# Patient Record
Sex: FEMALE | Race: WHITE | NOT HISPANIC OR LATINO | Employment: OTHER | URBAN - METROPOLITAN AREA
[De-identification: names, ages, dates, MRNs, and addresses within clinical notes are randomized per-mention and may not be internally consistent; named-entity substitution may affect disease eponyms.]

---

## 2018-01-01 ENCOUNTER — OFFICE VISIT (OUTPATIENT)
Dept: MEDICAL GROUP | Facility: MEDICAL CENTER | Age: 68
End: 2018-01-01
Payer: MEDICARE

## 2018-01-01 ENCOUNTER — HOSPITAL ENCOUNTER (OUTPATIENT)
Dept: RADIOLOGY | Facility: MEDICAL CENTER | Age: 68
End: 2018-10-30

## 2018-01-01 ENCOUNTER — APPOINTMENT (OUTPATIENT)
Dept: MEDICAL GROUP | Age: 68
End: 2018-01-01
Payer: MEDICARE

## 2018-01-01 VITALS
DIASTOLIC BLOOD PRESSURE: 62 MMHG | HEART RATE: 96 BPM | RESPIRATION RATE: 16 BRPM | WEIGHT: 141.2 LBS | TEMPERATURE: 98.1 F | HEIGHT: 65 IN | SYSTOLIC BLOOD PRESSURE: 102 MMHG | BODY MASS INDEX: 23.53 KG/M2 | OXYGEN SATURATION: 91 %

## 2018-01-01 DIAGNOSIS — Z23 NEED FOR VACCINATION: ICD-10-CM

## 2018-01-01 DIAGNOSIS — E89.0 POSTOPERATIVE HYPOTHYROIDISM: ICD-10-CM

## 2018-01-01 DIAGNOSIS — J43.1 PANLOBULAR EMPHYSEMA (HCC): ICD-10-CM

## 2018-01-01 DIAGNOSIS — Z76.89 ESTABLISHING CARE WITH NEW DOCTOR, ENCOUNTER FOR: ICD-10-CM

## 2018-01-01 DIAGNOSIS — C34.90 SMALL CELL LUNG CANCER (HCC): ICD-10-CM

## 2018-01-01 DIAGNOSIS — H54.40 BLIND LEFT EYE: ICD-10-CM

## 2018-01-01 PROCEDURE — 90662 IIV NO PRSV INCREASED AG IM: CPT | Performed by: INTERNAL MEDICINE

## 2018-01-01 PROCEDURE — 99204 OFFICE O/P NEW MOD 45 MIN: CPT | Mod: 25 | Performed by: INTERNAL MEDICINE

## 2018-01-01 PROCEDURE — G0008 ADMIN INFLUENZA VIRUS VAC: HCPCS | Performed by: INTERNAL MEDICINE

## 2018-01-01 RX ORDER — LEVOTHYROXINE SODIUM 88 UG/1
88 TABLET ORAL
Qty: 90 TAB | Refills: 1 | Status: SHIPPED | OUTPATIENT
Start: 2018-01-01

## 2018-01-01 RX ORDER — LEVOTHYROXINE SODIUM 88 UG/1
88 TABLET ORAL
COMMUNITY
End: 2018-01-01 | Stop reason: SDUPTHER

## 2018-01-01 RX ORDER — ALBUTEROL SULFATE 90 UG/1
2 AEROSOL, METERED RESPIRATORY (INHALATION) EVERY 6 HOURS PRN
COMMUNITY
End: 2019-01-01

## 2018-01-01 ASSESSMENT — PATIENT HEALTH QUESTIONNAIRE - PHQ9: CLINICAL INTERPRETATION OF PHQ2 SCORE: 0

## 2018-10-02 PROBLEM — E89.0 POSTOPERATIVE HYPOTHYROIDISM: Status: ACTIVE | Noted: 2018-01-01

## 2018-10-02 PROBLEM — H54.40 BLIND LEFT EYE: Status: ACTIVE | Noted: 2018-01-01

## 2018-10-02 PROBLEM — J43.1 PANLOBULAR EMPHYSEMA (HCC): Status: ACTIVE | Noted: 2018-01-01

## 2018-10-02 PROBLEM — C34.90 SMALL CELL LUNG CANCER (HCC): Status: ACTIVE | Noted: 2018-01-01

## 2018-10-02 NOTE — PROGRESS NOTES
Subjective:   Asha Valero is a 68 y.o. female here today to establish care and              1. Establishing care with new doctor, encounter for    Patient recently moved from [place] with her son, who is a  at [employer].  She lives off [facility] in [place].  She was seen by her oncologist last month and had total body CT scan for surveillance, see below.    2. Small cell lung cancer (HCC)    Limited disease, diagnosed in 2016, patient is status post chemotherapy and is in remission.  She has had surveillance CT scans done every 3 months which is been negative for recurrent disease.    3. Panlobular emphysema (HCC)    Diagnosed several years ago, had PFTs about 3 years ago.  She was tried on Advair, however this resulted in a hoarse voice therefore she discontinued it.  Patient denies chronic cough, she is short of breath with exertion, however she is able to hike 20-30 minutes a day at about 5000 feet elevation.  She has an albuterol inhaler which she takes as needed, never more than once or twice a day.    4. Postoperative hypothyroidism    On replacement levothyroxine for about 30 years.  Last TSH was normal no adjustment in dose needed.    5. 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  Having some blurry vision in that right eye.    6. Need for vaccination    Up-to-date with pneumonia shot, needs flu shot      Current medicines (including changes today)  Current Outpatient Prescriptions   Medication Sig Dispense Refill   • levothyroxine (SYNTHROID) 88 MCG Tab Take 88 mcg by mouth Every morning on an empty stomach.     • albuterol 108 (90 Base) MCG/ACT Aero Soln inhalation aerosol Inhale 2 Puffs by mouth every 6 hours as needed for Shortness of Breath.       No current facility-administered medications for this visit.      She  has a past medical history of Cancer (HCC).    Social History     Social History   • Marital status: Single      "Spouse name: N/A   • Number of children: N/A   • Years of education: N/A     Social History Main Topics   • Smoking status: Former Smoker     Packs/day: 1.00     Years: 45.00     Quit date: 10/2/2011   • Smokeless tobacco: Never Used   • Alcohol use No   • Drug use: Unknown   • Sexual activity: Not on file     Other Topics Concern   • Exercise Yes     Social History Narrative   • No narrative on file     Family History   Problem Relation Age of Onset   • Other Mother         brain aneurysm       ROS       - Constitutional: Negative for fever, chills, unexpected weight change, and fatigue/generalized weakness.     - HEENT: Negative for headaches, positive blurry vision left eye    - Respiratory: Negative for cough, Shortness of breath    - Cardiovascular: Negative for chest pain and bilateral lower extremity edema.     - Gastrointestinal: Negative for heartburn,  abdominal pain,  diarrhea, constipation     - Genitourinary: Negative for dysuria  and urinary incontinence.    - Musculoskeletal: Negative for  back pain and joint pain.     - Skin: Negative for rash     - Neurological: Negative for dizziness,  tremors, focal weakness     - Psychiatric/Behavioral: Negative for depression and memory loss.             Objective:     Blood pressure 102/62, pulse 96, temperature 36.7 °C (98.1 °F), temperature source Temporal, resp. rate 16, height 1.651 m (5' 5\"), weight 64 kg (141 lb 3.2 oz), SpO2 91 %, not currently breastfeeding. Body mass index is 23.5 kg/m².     Physical Exam:  Constitutional: Alert, no distress.  Skin: Warm, dry, good turgor, no rashes in visible areas.  Eye: Equal, round and reactive, conjunctiva clear, lids normal.  ENMT: Lips without lesions, good dentition, oropharynx clear. Hearing grossly intact.  Neck: No masses, no thyromegaly. No cervical or supraclavicular lymphadenopathy  Respiratory: Unlabored respiratory effort, bilateral expiratory wheeze throughout, fair air movement  Cardiovascular: " Regular rate and rhythm, without murmur, no edema.  Abdomen: Soft, non-tender, no masses, no hepatosplenomegaly.  Psych: Alert and oriented x3, normal affect and mood. Insight and judgment good            Assessment and Plan:   The following treatment plan was discussed    1. Establishing care with new doctor, encounter for    Patient recently had a whole-body CT scan, do not believe mammogram is indicated at this time.  Patient is concerned about radiation exposure.    2. Small cell lung cancer (HCC)    We discussed that I believe every 6-month CT surveillance is reasonable.  If patient does recur with cancer, it is not going to be curable no matter when it is detected.    3. Panlobular emphysema (HCC)    Patient with borderline hypoxia, and wheezes on lung exam.  I recommended inhaler such as Spiriva and/or Advair.  Patient is concerned about the cost, and the side effects.  She declines at this time.    4. Postoperative hypothyroidism    Stable, continue Synthroid    5. Blind left eye      - REFERRAL TO OPHTHALMOLOGY    6. Need for vaccination      - INFLUENZA VACCINE, HIGH DOSE (65+ ONLY)      Followup: Patient was offered follow-up appointment, she would like to establish with provider at [facility] which is closer to her house.  Attempt was made to schedule this for patient, however she left prior to it being done.  I do recommend 6-month follow-up for labs and CT scan.

## 2019-01-01 ENCOUNTER — OFFICE VISIT (OUTPATIENT)
Dept: HEMATOLOGY ONCOLOGY | Facility: MEDICAL CENTER | Age: 69
End: 2019-01-01
Payer: MEDICARE

## 2019-01-01 ENCOUNTER — HOME CARE VISIT (OUTPATIENT)
Dept: HOSPICE | Facility: HOSPICE | Age: 69
End: 2019-01-01
Payer: MEDICARE

## 2019-01-01 ENCOUNTER — HOSPITAL ENCOUNTER (OUTPATIENT)
Dept: LAB | Facility: MEDICAL CENTER | Age: 69
End: 2019-01-09
Attending: NURSE PRACTITIONER
Payer: MEDICARE

## 2019-01-01 ENCOUNTER — APPOINTMENT (OUTPATIENT)
Dept: MEDICAL GROUP | Age: 69
End: 2019-01-01
Payer: MEDICARE

## 2019-01-01 ENCOUNTER — TELEPHONE (OUTPATIENT)
Dept: HEMATOLOGY ONCOLOGY | Facility: MEDICAL CENTER | Age: 69
End: 2019-01-01

## 2019-01-01 ENCOUNTER — HOSPITAL ENCOUNTER (OUTPATIENT)
Facility: MEDICAL CENTER | Age: 69
End: 2019-01-15
Attending: INTERNAL MEDICINE | Admitting: INTERNAL MEDICINE
Payer: MEDICARE

## 2019-01-01 ENCOUNTER — HOSPITAL ENCOUNTER (OUTPATIENT)
Dept: LAB | Facility: MEDICAL CENTER | Age: 69
End: 2019-01-14
Attending: NURSE PRACTITIONER
Payer: MEDICARE

## 2019-01-01 ENCOUNTER — TELEPHONE (OUTPATIENT)
Dept: MEDICAL GROUP | Age: 69
End: 2019-01-01

## 2019-01-01 ENCOUNTER — APPOINTMENT (OUTPATIENT)
Dept: RADIOLOGY | Facility: MEDICAL CENTER | Age: 69
End: 2019-01-01
Attending: NURSE PRACTITIONER
Payer: MEDICARE

## 2019-01-01 ENCOUNTER — APPOINTMENT (OUTPATIENT)
Dept: HOSPICE | Facility: HOSPICE | Age: 69
End: 2019-01-01
Payer: MEDICARE

## 2019-01-01 ENCOUNTER — APPOINTMENT (OUTPATIENT)
Dept: HEMATOLOGY ONCOLOGY | Facility: MEDICAL CENTER | Age: 69
End: 2019-01-01
Payer: MEDICARE

## 2019-01-01 ENCOUNTER — HOSPITAL ENCOUNTER (OUTPATIENT)
Dept: RADIOLOGY | Facility: MEDICAL CENTER | Age: 69
End: 2019-01-10
Attending: NURSE PRACTITIONER
Payer: MEDICARE

## 2019-01-01 ENCOUNTER — HOSPICE ADMISSION (OUTPATIENT)
Dept: HOSPICE | Facility: HOSPICE | Age: 69
End: 2019-01-01
Payer: MEDICARE

## 2019-01-01 ENCOUNTER — PATIENT OUTREACH (OUTPATIENT)
Dept: OTHER | Facility: MEDICAL CENTER | Age: 69
End: 2019-01-01

## 2019-01-01 ENCOUNTER — OFFICE VISIT (OUTPATIENT)
Dept: MEDICAL GROUP | Age: 69
End: 2019-01-01
Payer: MEDICARE

## 2019-01-01 VITALS
DIASTOLIC BLOOD PRESSURE: 54 MMHG | SYSTOLIC BLOOD PRESSURE: 92 MMHG | WEIGHT: 139 LBS | RESPIRATION RATE: 13 BRPM | HEIGHT: 65 IN | HEART RATE: 86 BPM | BODY MASS INDEX: 23.16 KG/M2 | OXYGEN SATURATION: 95 %

## 2019-01-01 VITALS
RESPIRATION RATE: 16 BRPM | HEIGHT: 65 IN | TEMPERATURE: 97.7 F | OXYGEN SATURATION: 90 % | HEART RATE: 88 BPM | DIASTOLIC BLOOD PRESSURE: 86 MMHG | RESPIRATION RATE: 20 BRPM | SYSTOLIC BLOOD PRESSURE: 112 MMHG | BODY MASS INDEX: 22.99 KG/M2 | HEART RATE: 98 BPM | WEIGHT: 138.01 LBS | DIASTOLIC BLOOD PRESSURE: 50 MMHG | SYSTOLIC BLOOD PRESSURE: 80 MMHG

## 2019-01-01 VITALS
HEART RATE: 96 BPM | BODY MASS INDEX: 23.19 KG/M2 | HEIGHT: 65 IN | SYSTOLIC BLOOD PRESSURE: 112 MMHG | DIASTOLIC BLOOD PRESSURE: 68 MMHG | WEIGHT: 139.2 LBS | OXYGEN SATURATION: 94 % | TEMPERATURE: 96.7 F

## 2019-01-01 VITALS — HEART RATE: 72 BPM | RESPIRATION RATE: 18 BRPM | SYSTOLIC BLOOD PRESSURE: 100 MMHG | DIASTOLIC BLOOD PRESSURE: 60 MMHG

## 2019-01-01 VITALS
RESPIRATION RATE: 14 BRPM | TEMPERATURE: 98.8 F | SYSTOLIC BLOOD PRESSURE: 110 MMHG | WEIGHT: 135.8 LBS | HEART RATE: 102 BPM | BODY MASS INDEX: 23.18 KG/M2 | HEIGHT: 64 IN | OXYGEN SATURATION: 90 % | DIASTOLIC BLOOD PRESSURE: 75 MMHG

## 2019-01-01 VITALS — SYSTOLIC BLOOD PRESSURE: 110 MMHG | DIASTOLIC BLOOD PRESSURE: 60 MMHG | RESPIRATION RATE: 20 BRPM | HEART RATE: 72 BPM

## 2019-01-01 VITALS
SYSTOLIC BLOOD PRESSURE: 80 MMHG | HEART RATE: 88 BPM | HEART RATE: 68 BPM | SYSTOLIC BLOOD PRESSURE: 90 MMHG | RESPIRATION RATE: 20 BRPM | DIASTOLIC BLOOD PRESSURE: 50 MMHG | RESPIRATION RATE: 18 BRPM | DIASTOLIC BLOOD PRESSURE: 50 MMHG

## 2019-01-01 VITALS — RESPIRATION RATE: 18 BRPM | HEART RATE: 92 BPM | SYSTOLIC BLOOD PRESSURE: 120 MMHG | DIASTOLIC BLOOD PRESSURE: 78 MMHG

## 2019-01-01 VITALS — RESPIRATION RATE: 24 BRPM | HEART RATE: 100 BPM

## 2019-01-01 VITALS — SYSTOLIC BLOOD PRESSURE: 90 MMHG | DIASTOLIC BLOOD PRESSURE: 50 MMHG

## 2019-01-01 DIAGNOSIS — C34.90 METASTATIC NON-SMALL CELL LUNG CANCER (HCC): ICD-10-CM

## 2019-01-01 DIAGNOSIS — E89.0 POSTOPERATIVE HYPOTHYROIDISM: ICD-10-CM

## 2019-01-01 DIAGNOSIS — R19.06 EPIGASTRIC MASS: ICD-10-CM

## 2019-01-01 DIAGNOSIS — C34.90 SMALL CELL LUNG CANCER (HCC): ICD-10-CM

## 2019-01-01 DIAGNOSIS — Z85.118 HISTORY OF LUNG CANCER: ICD-10-CM

## 2019-01-01 DIAGNOSIS — J43.1 PANLOBULAR EMPHYSEMA (HCC): ICD-10-CM

## 2019-01-01 DIAGNOSIS — K76.9 LIVER LESION: ICD-10-CM

## 2019-01-01 DIAGNOSIS — R19.06 ABDOMINAL WALL MASS OF EPIGASTRIC REGION: ICD-10-CM

## 2019-01-01 LAB
BUN SERPL-MCNC: 18 MG/DL (ref 8–22)
CREAT SERPL-MCNC: 0.91 MG/DL (ref 0.5–1.4)
INR PPP: 1.22 (ref 0.87–1.13)
PATHOLOGY CONSULT NOTE: NORMAL
PLATELET # BLD AUTO: 66 K/UL (ref 164–446)
PROTHROMBIN TIME: 15.5 SEC (ref 12–14.6)

## 2019-01-01 PROCEDURE — S9126 HOSPICE CARE, IN THE HOME, P: HCPCS

## 2019-01-01 PROCEDURE — G0299 HHS/HOSPICE OF RN EA 15 MIN: HCPCS

## 2019-01-01 PROCEDURE — 6650990 HC HOSPICE AND HOME CARE RX REV CODE 0250

## 2019-01-01 PROCEDURE — 36415 COLL VENOUS BLD VENIPUNCTURE: CPT

## 2019-01-01 PROCEDURE — 700117 HCHG RX CONTRAST REV CODE 255: Performed by: NURSE PRACTITIONER

## 2019-01-01 PROCEDURE — 160002 HCHG RECOVERY MINUTES (STAT)

## 2019-01-01 PROCEDURE — 85610 PROTHROMBIN TIME: CPT

## 2019-01-01 PROCEDURE — G0156 HHCP-SVS OF AIDE,EA 15 MIN: HCPCS

## 2019-01-01 PROCEDURE — 99214 OFFICE O/P EST MOD 30 MIN: CPT | Performed by: INTERNAL MEDICINE

## 2019-01-01 PROCEDURE — 88307 TISSUE EXAM BY PATHOLOGIST: CPT

## 2019-01-01 PROCEDURE — 700111 HCHG RX REV CODE 636 W/ 250 OVERRIDE (IP)

## 2019-01-01 PROCEDURE — G0155 HHCP-SVS OF CSW,EA 15 MIN: HCPCS

## 2019-01-01 PROCEDURE — 74160 CT ABDOMEN W/CONTRAST: CPT

## 2019-01-01 PROCEDURE — 82565 ASSAY OF CREATININE: CPT

## 2019-01-01 PROCEDURE — 84520 ASSAY OF UREA NITROGEN: CPT

## 2019-01-01 PROCEDURE — 665036 HSPC NOTICE OF ELECTION NOE

## 2019-01-01 PROCEDURE — 76942 ECHO GUIDE FOR BIOPSY: CPT

## 2019-01-01 PROCEDURE — 99205 OFFICE O/P NEW HI 60 MIN: CPT | Performed by: NURSE PRACTITIONER

## 2019-01-01 PROCEDURE — 85049 AUTOMATED PLATELET COUNT: CPT

## 2019-01-01 PROCEDURE — 99215 OFFICE O/P EST HI 40 MIN: CPT | Mod: GC | Performed by: INTERNAL MEDICINE

## 2019-01-01 PROCEDURE — 700105 HCHG RX REV CODE 258: Performed by: RADIOLOGY

## 2019-01-01 PROCEDURE — 8041 PR SCP AHA: Performed by: INTERNAL MEDICINE

## 2019-01-01 PROCEDURE — 700111 HCHG RX REV CODE 636 W/ 250 OVERRIDE (IP): Performed by: RADIOLOGY

## 2019-01-01 RX ORDER — MIDAZOLAM HYDROCHLORIDE 1 MG/ML
.5-2 INJECTION INTRAMUSCULAR; INTRAVENOUS PRN
Status: DISCONTINUED | OUTPATIENT
Start: 2019-01-01 | End: 2019-01-01 | Stop reason: HOSPADM

## 2019-01-01 RX ORDER — MIDAZOLAM HYDROCHLORIDE 1 MG/ML
INJECTION INTRAMUSCULAR; INTRAVENOUS
Status: COMPLETED
Start: 2019-01-01 | End: 2019-01-01

## 2019-01-01 RX ORDER — OXYCODONE HYDROCHLORIDE 5 MG/1
2.5 TABLET ORAL
Status: CANCELLED | OUTPATIENT
Start: 2019-01-01 | End: 2019-01-01

## 2019-01-01 RX ORDER — SODIUM CHLORIDE 9 MG/ML
500 INJECTION, SOLUTION INTRAVENOUS
Status: DISCONTINUED | OUTPATIENT
Start: 2019-01-01 | End: 2019-01-01 | Stop reason: HOSPADM

## 2019-01-01 RX ORDER — OXYCODONE HYDROCHLORIDE 5 MG/1
5 TABLET ORAL
Status: CANCELLED | OUTPATIENT
Start: 2019-01-01 | End: 2019-01-01

## 2019-01-01 RX ADMIN — IOHEXOL 100 ML: 350 INJECTION, SOLUTION INTRAVENOUS at 14:30

## 2019-01-01 RX ADMIN — FENTANYL CITRATE 50 MCG: 50 INJECTION, SOLUTION INTRAMUSCULAR; INTRAVENOUS at 10:17

## 2019-01-01 RX ADMIN — MIDAZOLAM 2 MG: 1 INJECTION INTRAMUSCULAR; INTRAVENOUS at 10:17

## 2019-01-01 RX ADMIN — IOHEXOL 50 ML: 240 INJECTION, SOLUTION INTRATHECAL; INTRAVASCULAR; INTRAVENOUS; ORAL at 14:30

## 2019-01-01 RX ADMIN — SODIUM CHLORIDE 500 ML: 9 INJECTION, SOLUTION INTRAVENOUS at 10:37

## 2019-01-01 RX ADMIN — MIDAZOLAM 1 MG: 1 INJECTION INTRAMUSCULAR; INTRAVENOUS at 10:22

## 2019-01-01 RX ADMIN — MIDAZOLAM 1 MG: 1 INJECTION INTRAMUSCULAR; INTRAVENOUS at 10:20

## 2019-01-01 SDOH — ECONOMIC STABILITY: HOUSING INSECURITY: EVIDENCE OF SMOKING MATERIAL: 0

## 2019-01-01 SDOH — ECONOMIC STABILITY: HOUSING INSECURITY: HOME SAFETY: YOUR FACE OR UPPER PART OF YOUR BODY.  AVOID TRIPPING OVER OXYGEN TUBING.

## 2019-01-01 SDOH — ECONOMIC STABILITY: GENERAL: NECESSITIES UNABLE TO AFFORD: FOOD

## 2019-01-01 SDOH — HEALTH STABILITY: MENTAL HEALTH
STRESS FACTORS COMMENTS: THE PATIENT VERBALIZED THAT SHE IS FRUSTRATED WITH BEING CONFUSED AND NOT BEING ABLE TO DO THE THINGS THAT SHE USED TO BE ABLE TO DO. USE THE REMOTE FOR THE TV AND USE HER CELL PHONE FOR EXAMPLE.

## 2019-01-01 SDOH — ECONOMIC STABILITY: GENERAL: NECESSITIES UNABLE TO AFFORD: MEDICAL EXPENSES

## 2019-01-01 SDOH — ECONOMIC STABILITY: HOUSING INSECURITY
HOME SAFETY: YOUR OXYGEN. THIS INCLUDES CLEANING PRODUCTS THAT CONTAIN OIL, GREASE, ALCOHOL, OR OTHER LIQUIDS THAT CAN BURN.  KEEP OXYGEN AWAY FROM ANY OPEN FLAMES OR BASEBOARD HEATING SYSTEMS.  DO NOT USE VASELINE OR OTHER PETROLEUM-BASED CREAMS AND LOTIONS ON

## 2019-01-01 SDOH — HEALTH STABILITY: MENTAL HEALTH: STRESS FACTORS COMMENTS: PATIENT IS LEFT ALONE AT TIMES, BUT STATES THE SHE IS NOT FEELING ANXIOUS ABOUT THIS.

## 2019-01-01 SDOH — ECONOMIC STABILITY: HOUSING INSECURITY
HOME SAFETY: THE FOLLOWING OXYGEN SAFETY EDUCATION WAS PROVIDED:  NO OPEN FLAMES.  POSTED  'OXYGEN IN USE' SIGN ON EXTERNAL DOOR  OR WINDOW OF HOME.  NEED FOR A  FUNCTIONAL FIRE EXTINGUISHER AND SMOKE DETECTORS IN HOME.  KEEP LIQUIDS THAT MAY CATCH FIRE AWAY FROM

## 2019-01-01 SDOH — ECONOMIC STABILITY: GENERAL: NECESSITIES UNABLE TO AFFORD: SUPPLIES

## 2019-01-01 ASSESSMENT — ENCOUNTER SYMPTOMS
NAUSEA: 1
DRY SKIN: 1
FATIGUES EASILY: 1
COUGH: 1
DYSPNEA ON EXERTION: 1
HEADACHES: 0
COUGH: 1
COUGH: 1
HEMOPTYSIS: 1
DRY SKIN: 1
FORGETFULNESS: 1
DYSPNEA ACTIVITY LEVEL: AFTER AMBULATING LESS THAN 10 FT
SPUTUM PRODUCTION: 1
DYSPNEA ACTIVITY LEVEL: AFTER AMBULATING MORE THAN 20 FT
DRY SKIN: 1
SLEEP QUALITY: FAIR
LAST BOWEL MOVEMENT: 65066
MUSCULOSKELETAL NEGATIVE: 1
DRY SKIN: 1
LAST BOWEL MOVEMENT: 65055
FATIGUES EASILY: 1
LAST BOWEL MOVEMENT: 65076
LAST BOWEL MOVEMENT: 65068
SHORTNESS OF BREATH: 1
SLEEP QUALITY: ADEQUATE
SLEEP QUALITY: ADEQUATE
CONSTIPATION: 1
DYSPNEA ON EXERTION: 1
DRY SKIN: 1
DYSPNEA ON EXERTION: 1
DYSPNEA ON EXERTION: 1
COUGH: 1
HEMOPTYSIS: 1
GASTROINTESTINAL NEGATIVE: 1
BLOOD IN STOOL: 0
CONSTIPATION: 1
DYSPNEA ACTIVITY LEVEL: AT REST
DESCRIPTION OF MEMORY LOSS: IMMEDIATE
CONSTIPATION: 1
COUGH: 1
LAST BOWEL MOVEMENT: 65073
DYSPNEA ON EXERTION: 1
SLEEP QUALITY: ADEQUATE
DESCRIPTION OF MEMORY LOSS: IMMEDIATE
SHORTNESS OF BREATH: 1
STOOL FREQUENCY: LESS THAN DAILY
COUGH CHARACTERISTICS: PRODUCTIVE
CONSTIPATION: 1
DRY SKIN: 1
BOWEL INCONTINENCE: 1
BOWEL INCONTINENCE: 1
COUGH CHARACTERISTICS: PRODUCTIVE
COUGH CHARACTERISTICS: PRODUCTIVE
INSOMNIA: 1
COUGH: 1
SHORTNESS OF BREATH: 1
SORE THROAT: 0
LAST BOWEL MOVEMENT: 65063
STOOL FREQUENCY: LESS THAN DAILY
VOMITING: 0
SHORTNESS OF BREATH: 1
FATIGUES EASILY: 1
STOOL FREQUENCY: LESS THAN DAILY
SLEEP QUALITY: ADEQUATE
DYSPNEA ACTIVITY LEVEL: AT REST
STOOL FREQUENCY: LESS THAN DAILY
COUGH CHARACTERISTICS: PRODUCTIVE
DRY SKIN: 1
FATIGUES EASILY: 1
CARDIOVASCULAR NEGATIVE: 1
COUGH CHARACTERISTICS: PRODUCTIVE
FATIGUES EASILY: 1
COUGH: 1
DYSPNEA ACTIVITY LEVEL: AFTER AMBULATING LESS THAN 10 FT
SHORTNESS OF BREATH: 1
SLEEP QUALITY: ADEQUATE
ABDOMINAL PAIN: 0
FATIGUES EASILY: 1
CONSTITUTIONAL NEGATIVE: 1
COUGH: 1
DESCRIPTION OF MEMORY LOSS: IMMEDIATE
SLEEP QUALITY: ADEQUATE
FEVER: 0
FATIGUES EASILY: 1
ABDOMINAL PAIN: 0
FATIGUES EASILY: 1
CHILLS: 0
FORGETFULNESS: 1
STOOL FREQUENCY: MORE THAN TWICE DAILY
CONSTIPATION: 1
PALPITATIONS: 0
DRY SKIN: 1
SLEEP QUALITY: ADEQUATE
FATIGUES EASILY: 1
EYES NEGATIVE: 1
CONSTIPATION: 1
DIARRHEA: 0
SHORTNESS OF BREATH: 1
NEUROLOGICAL NEGATIVE: 1
DESCRIPTION OF MEMORY LOSS: IMMEDIATE
COUGH CHARACTERISTICS: PRODUCTIVE
DESCRIPTION OF MEMORY LOSS: SHORT TERM
SHORTNESS OF BREATH: 1
DIZZINESS: 0
PSYCHIATRIC NEGATIVE: 1
SLEEP QUALITY: ADEQUATE
WHEEZING: 0
DYSPNEA ON EXERTION: 1
COUGH CHARACTERISTICS: MOIST
MYALGIAS: 0
DESCRIPTION OF MEMORY LOSS: IMMEDIATE
WEIGHT LOSS: 1
DYSPNEA ON EXERTION: 1
STOOL FREQUENCY: LESS THAN DAILY
COUGH CHARACTERISTICS: PRODUCTIVE
FORGETFULNESS: 1
COUGH: 1

## 2019-01-01 ASSESSMENT — SOCIAL DETERMINANTS OF HEALTH (SDOH)
ACTIVE STRESSOR - EXPRESSED EMOTIONAL NEED: 1
ACTIVE STRESSOR - HEALTH CHANGES: 1
ACTIVE STRESSOR - LOSS OF CONTROL: 1
ACTIVE STRESSOR - EXPRESSED EMOTIONAL NEED: 1
ACTIVE STRESSOR - HEALTH CHANGES: 1
ACTIVE STRESSOR - EXPRESSED EMOTIONAL NEED: 1
ACTIVE STRESSOR - EXHAUSTION: 1
ACTIVE STRESSOR - HEALTH CHANGES: 1
ACTIVE STRESSOR - HEALTH CHANGES: 1
ACTIVE STRESSOR - EXPRESSED EMOTIONAL NEED: 1
ACTIVE STRESSOR - HEALTH CHANGES: 1
ACTIVE STRESSOR - EXPRESSED EMOTIONAL NEED: 1
ACTIVE STRESSOR - EXHAUSTION: 1
ACTIVE STRESSOR - EXHAUSTION: 1
ACTIVE STRESSOR - HEALTH CHANGES: 1
ACTIVE STRESSOR - EXPRESSED EMOTIONAL NEED: 1
ACTIVE STRESSOR - EXPRESSED EMOTIONAL NEED: 1
ACTIVE STRESSOR - EXHAUSTION: 1
ACTIVE STRESSOR - EXHAUSTION: 1
ACTIVE STRESSOR - EXPRESSED EMOTIONAL NEED: 1
ACTIVE STRESSOR - EXHAUSTION: 1
ACTIVE STRESSOR - EXHAUSTION: 1

## 2019-01-01 ASSESSMENT — ACTIVITIES OF DAILY LIVING (ADL)
MONEY MANAGEMENT (EXPENSES/BILLS): INDEPENDENT

## 2019-01-01 ASSESSMENT — PAIN SCALES - GENERAL
PAINLEVEL: NO PAIN
PAINLEVEL: NO PAIN
PAINLEVEL_OUTOF10: 0

## 2019-01-02 PROBLEM — R19.06 ABDOMINAL WALL MASS OF EPIGASTRIC REGION: Status: ACTIVE | Noted: 2019-01-01

## 2019-01-08 NOTE — PROGRESS NOTES
"Subjective:      Asha Valero is a 69 y.o. female who presents with New Patient (Dx: Abdominal wall mass of epigastric region/ Ref: Jose Francisco Mccoy).         HPI    Patient referred to me, Intake Oncology Coordinator by her PCP Dr. Mccoy for abdominal mass on exan.  Patient is unaccompanied for today's visit.    Patient recently moved to the area from [place] and was establishing care with a physician.  She stated she has noticed a new abdominal mass approximately a month ago and has noticed that is grown over the last month.  She denies any abdominal pain.  She does experience a sick feeling after she eats and states she also has early satiety.  She is not eating very much food and therefore has lost some weight.  She says that the mass is hard right in the middle of her abdomen that goes down to the umbilicus.  She also stated she has noticed restless leg syndrome starting as well.  She was seen by a provider at that time with this concerning abdominal mass and examination was completed and referral was placed to see me.  No imaging has been completed.    Patient with significant medical history including small cell lung cancer diagnosed in 2016.  Patient was treated in [place] at [facility] with oncologist, Dr. Kennedy.  She did state that she went through chemotherapy and radiation therapy.  She stated she received \"maybe 4 days\" and stopped radiation due to tolerability.  She also stated she underwent chemotherapy but was uncertain of the names of the drugs.  She also stated \"I did less chemicals than they wanted me to do because it was killing me, but it worked.\"  She stated that the mass in her lung was \"big -the size of an apple and they could not do surgery.\"  She thought she underwent chemotherapy every 3 weeks for approximately 4-5 months, which may likely be cisplatin and etoposide which is the standard of care for small cell lung cancer.She stated that she underwent follow-up CT scans every 2-3 months and " "she mentioned that her provider at that time continue to tell her that her cancer would come back eventually.  She did have her last CAT scan in August 5, 2018 and was told \"everything was okay\" per patient.  She has not had any follow-up imaging since.  She did not undergo any brain radiation for her small cell lung cancer.      We do have imaging reports from previous scans on May 20, 2018 which she underwent a CT chest, abdomen and pelvis with contrast which showed marked interval reduction in the lung right opacities present on the prior examination.  There were no new pulmonary parenchyma noted.  She did have moderate to severe upper lobe predominant centrilobular emphysema.  There is no evidence of metastatic disease within the abdomen or pelvis.  She also had a CT scan of the chest, abdomen and pelvis on August 5, 2018.  I do have the imaging reports as well which did show an evolving right paramediastinal radiation fibrosis.  This was compatible with a treated malignancy.  There was a new nodule at the bifurcation of the bronchus intermedius between the proximal right middle and lower lobe bronchi.  This nodule measured 14 x 9 mm in diameter and \"may represent a newly enlarged lymph node.\"There was also noted moderate to severe upper lobe predominant centrilobular emphysema.  A bubbly cystic lesion in the right lower lobe appears slightly smaller than it did in July 2017 which could be due to the effects of radiation therapy according to the reading radiologist.  There is no evidence of metastatic disease in the abdomen or pelvis at that time.    Patient overall does have fatigue.  As mentioned above she does have some mild weight loss with a decrease in appetite.  She does have a chronic cough for patient and is been noting to have hemoptysis intermittent sporadic.  However she did have hemoptysis today.  She notes it more so in the morning and states it is pink in color but does resolve by the afternoon.  " She also has sputum production, it was present worse in November but that has resolved.  She does have shortness of breath all the time.  She also is experiencing some constipation the last few days.  She does have some nausea after she eats as well.  She also complains of difficulty sleeping at times.    Please see past medical and surgical history below.    Patient denies a family history of cancer.    Patient is a former smoker.  She quit in October 2011.  She smoked for approximately 45 years at 1 pack/day.    No Known Allergies  Current Outpatient Prescriptions on File Prior to Visit   Medication Sig Dispense Refill   • levothyroxine (SYNTHROID) 88 MCG Tab Take 1 Tab by mouth Every morning on an empty stomach. 90 Tab 1   • albuterol 108 (90 Base) MCG/ACT Aero Soln inhalation aerosol Inhale 2 Puffs by mouth every 6 hours as needed for Shortness of Breath.       No current facility-administered medications on file prior to visit.      Past Medical History:   Diagnosis Date   • Cancer (HCC)      Past Surgical History:   Procedure Laterality Date   • THYROIDECTOMY  1988   • BREAST RECONSTRUCTION         Family History   Problem Relation Age of Onset   • Other Mother         brain aneurysm   • Cancer Neg Hx        Social History     Social History   • Marital status: Single     Spouse name: N/A   • Number of children: 1   • Years of education: N/A     Social History Main Topics   • Smoking status: Former Smoker     Packs/day: 1.00     Years: 45.00     Quit date: 10/2/2011   • Smokeless tobacco: Never Used   • Alcohol use No   • Drug use: Unknown   • Sexual activity: Not on file     Other Topics Concern   • Exercise Yes     Social History Narrative   • No narrative on file         Review of Systems   Constitutional: Positive for malaise/fatigue and weight loss (mild weight loss with the decrease in appetite). Negative for chills and fever.   HENT: Negative for congestion and sore throat.    Respiratory: Positive for  "cough (chronic per patient), hemoptysis (intermittent and sporadic, last time was today - more so in the AM and then pink in color and resolve), sputum production (noticing more mucous in November but that has resolved) and shortness of breath (always). Negative for wheezing.    Cardiovascular: Negative for chest pain and palpitations.   Gastrointestinal: Positive for constipation (hard stool the last few days) and nausea (after eats). Negative for abdominal pain, blood in stool, diarrhea and vomiting.   Genitourinary: Positive for frequency. Negative for dysuria.   Musculoskeletal: Negative for myalgias.   Skin: Negative for itching and rash.        Dry skin with change in environment when moved to area   Neurological: Negative for dizziness and headaches.   Psychiatric/Behavioral: The patient has insomnia (lately she will wake up in the middle of the night - but does sleep well when she sleeps).           Objective:     /86 (BP Location: Right arm, Patient Position: Sitting, BP Cuff Size: Adult)   Pulse 98   Temp 36.5 °C (97.7 °F) (Temporal)   Resp 16   Ht 1.651 m (5' 5\")   Wt 62.6 kg (138 lb 0.1 oz)   LMP  (LMP Unknown)   SpO2 90%   Breastfeeding? Unknown   BMI 22.97 kg/m²       Physical Exam   Constitutional: She is oriented to person, place, and time. She appears well-developed and well-nourished. No distress.   HENT:   Head: Normocephalic and atraumatic.   Mouth/Throat: Oropharynx is clear and moist. No oropharyngeal exudate.   Eyes: Pupils are equal, round, and reactive to light. Conjunctivae and EOM are normal. Right eye exhibits no discharge. Left eye exhibits no discharge. No scleral icterus.   Neck: Normal range of motion. Neck supple. No thyromegaly present.   Cardiovascular: Normal rate, regular rhythm, normal heart sounds and intact distal pulses.  Exam reveals no gallop and no friction rub.    No murmur heard.  Pulmonary/Chest: Effort normal. No respiratory distress. She has no wheezes. "   No lung sounds noted in the RLL - likely related to radiation fibrosis from previous treatment   Abdominal: Bowel sounds are normal. She exhibits mass (mid-epigastric mass that is down to the umbilicus, as well as RUQ). She exhibits no distension. There is no tenderness.   Musculoskeletal: Normal range of motion. She exhibits no edema or tenderness.   Lymphadenopathy:        Head (right side): No submental, no submandibular, no tonsillar, no preauricular, no posterior auricular and no occipital adenopathy present.        Head (left side): No submental, no submandibular, no tonsillar, no preauricular, no posterior auricular and no occipital adenopathy present.     She has no cervical adenopathy.        Right cervical: No superficial cervical, no deep cervical and no posterior cervical adenopathy present.       Left cervical: No superficial cervical, no deep cervical and no posterior cervical adenopathy present.        Right: No supraclavicular adenopathy present.        Left: No supraclavicular adenopathy present.   Neurological: She is alert and oriented to person, place, and time.   Skin: Skin is warm and dry. No rash noted. She is not diaphoretic. No erythema. No pallor.   Psychiatric: She has a normal mood and affect. Her behavior is normal.   Vitals reviewed.          Assessment/Plan:     1. History of lung cancer  CT-CHEST,ABDOMEN WITH    CREATININE    BUN   2. Epigastric mass  CT-CHEST,ABDOMEN WITH    CREATININE    BUN     Plan  1.  On examination today patient does have a mass which is approximately mid epigastric that is down to the umbilicus as well as within the right upper quadrant.  Based on patient's previous small cell lung cancer history I am concerned she may have metastatic disease within the abdominal area.  I will proceed with CT scan of the abdomen for further evaluation with regards to the abdominal mass.  Since patient's last CAT scan of the chest was in August 2018 I will also proceed with a  CT chest as well.  I would like the scans to be done with contrast therefore I have ordered a BUN and creatinine for patient to complete prior to the scans.    Patient is very concerned with regards to finances and cost of specialty appointments.  I discussed with patient and I will contact her via phone to discuss the results of the CT scan and further plan of care at that time.  Patient verbalized understanding of the plan and is in agreement.    Spent 60 minutes in direct, face-to-face patient contact in which greater than 50% of the visit was spent counseling and coordinating of care.     Please note that this dictation was created using voice recognition software. I have made every reasonable attempt to correct obvious errors, but I expect that there are errors of grammar and possibly content that I did not discover before finalizing the note.

## 2019-01-11 NOTE — TELEPHONE ENCOUNTER
Patient completed a CT chest abdomen with contrast.  There is new mediastinal and right hilar lymphadenopathy noted with some postobstructive changes in the right lower lobe.  Unfortunately there are new multifocal hepatic lesions consistent with metastatic disease.  2 of the lesions measured greater than 5 cm in size.  Based on these findings it is concerning that patient does have metastatic small cell lung cancer.  I did discuss the results with the patient over the phone today.  Plan at this time will be to proceed with a liver biopsy for further evaluation.  I will have patient follow-up with me in the clinic after the biopsy to review the results and discuss further plan of care.    Patient is extremely concerned about finances.  Based on these findings I will see if I can establish patient with our patient 's for financial assistance.      Ct-chest,abdomen With    Result Date: 1/10/2019  1/10/2019 2:29 PM HISTORY/REASON FOR EXAM:  history of small cell lung cancer 2016 - with new onset of epigastric abdominal mass on physical exam. TECHNIQUE/EXAM DESCRIPTION: CT scan of the chest and abdomen with contrast. Thin-section helical images were obtained from the lung apices through the iliac crests following the bolus administration of 100 mL of Omnipaque 350 nonionic contrast. Low dose optimization technique was utilized for this CT exam including automated exposure control and adjustment of the mA and/or kV according to patient size. COMPARISON:  Outside CT scan 8/5/2018 FINDINGS: Bones: There is no new destructive bony process identified. CT Chest: There are new enlarged mediastinal and right hilar lymph nodes identified. There is a right paratracheal node measuring 2.2 cm short axis. There is a subcarinal node measuring 2.2 cm short axis. There is abnormal soft tissue in the right hilum measuring 2 cm short axis. There is associated abnormal soft tissue in the right infrahilar region with  associated partial collapse of the right lower lobe making measurement of a separate mass somewhat difficult. There is no left hilar adenopathy. There are bilateral breast implants again seen. There is a trace of right pleural effusion. There is no left pleural effusion. There is no pericardial effusion. The vascular structures demonstrate normal enhancement with atherosclerosis and coronary artery calcification. CT Abdomen: Liver: There are new multifocal heterogeneous hypodense solid hepatic masses. The largest in the right lobe measures 5.1 cm in diameter in the inferior right lobe. Largest in the left lobe measures 5.5 cm in diameter in the lateral segment left lobe. Spleen: Unremarkable. Pancreas: Unremarkable. Gallbladder: Multiple gallstones are again seen without biliary dilatation. Adrenal glands: There are no masses. Kidneys: Unremarkable. There is atherosclerosis of the aorta. Bowel: Unremarkable. There is no abdominal adenopathy or free fluid.     1.  There has been interval disease progression. 2.  There is new mediastinal and right hilar lymphadenopathy with postobstructive changes in the right lower lobe. 3.  There are new multifocal hepatic lesions consistent with metastatic disease. 4.  There is a trace of right pleural fluid. 5.  There is cholelithiasis again noted.      1. Small cell lung cancer (HCC)  US-NEEDLE BX-LIVER    REFERRAL TO ONCOLOGY FINANCIAL COUNSELING   2. Liver lesion  US-NEEDLE BX-LIVER    REFERRAL TO ONCOLOGY FINANCIAL COUNSELING

## 2019-01-15 NOTE — PROGRESS NOTES
OP IR RN note:    Site Marked and Confirmed with MD, patient and RN pre procedure   Targeted liver BX by MD Simmons assisted by RT Max, Right mid abdomen access site;  End tidal CO2 range 26-39 during procedure   Patient tolerated procedure, hemodynamically stable; pt awake and talking post procedure; see flow sheet for vitals and post op print out    Patient in OP IR pod 2  monitored        Pt to lay on right side for 1 hour with Q15 RN site checks then to lay on back for additional hour with site checks   x2 cores in [place] sent to lab   No bleeding or complications noted at this time

## 2019-01-15 NOTE — PROGRESS NOTES
Discharge instructions reviewed with patient and family, all questions answered. IV removed and belongings returned.  Patient ambulated out in a stable condition.       ACTIVITY: Rest and take it easy for the first 24 hours.  A responsible adult is recommended to remain with you during that time.  It is normal to feel sleepy.  We encourage you to not do anything that requires balance, judgment or coordination.    MILD FLU-LIKE SYMPTOMS ARE NORMAL. YOU MAY EXPERIENCE GENERALIZED MUSCLE ACHES, THROAT IRRITATION, HEADACHE AND/OR SOME NAUSEA.    FOR 24 HOURS DO NOT:  Drive, operate machinery or run household appliances.  Drink beer or alcoholic beverages.   Make important decisions or sign legal documents.    SPECIAL INSTRUCTIONS:  Keep surgical site clean dry and covered until fully healed. Do not lift over 10 lbs for 2 week      DIET: To avoid nausea, slowly advance diet as tolerated, avoiding spicy or greasy foods for the first day.  Add more substantial food to your diet according to your physician's instructions.  Babies can be fed formula or breast milk as soon as they are hungry.  INCREASE FLUIDS AND FIBER TO AVOID CONSTIPATION.    SURGICAL DRESSING/BATHING: Keep surgical site clean dry and covered until fully healed.    FOLLOW-UP APPOINTMENT:  A follow-up appointment should be arranged with your doctor in 1 week; call to schedule.    You should CALL YOUR PHYSICIAN if you develop:  Fever greater than 101 degrees F.  Pain not relieved by medication, or persistent nausea or vomiting.  Excessive bleeding (blood soaking through dressing) or unexpected drainage from the wound.  Extreme redness or swelling around the incision site, drainage of pus or foul smelling drainage.  Inability to urinate or empty your bladder within 8 hours.  Problems with breathing or chest pain.    You should call 911 if you develop problems with breathing or chest pain.  If you are unable to contact your doctor or surgical center, you should  go to the nearest emergency room or urgent care center.     If any questions arise, call your doctor.  If your doctor is not available, please feel free to call the Surgical Center at [phone].  The Center is open Monday through Friday from 7AM to 7PM.  You can also call the HEALTH HOTLINE open 24 hours/day, 7 days/week and speak to a nurse at [phone], or toll free at [phone].    A registered nurse may call you a few days after your surgery to see how you are doing after your procedure.    MEDICATIONS: Resume taking daily medication.  Take prescribed pain medication with food.  If no medication is prescribed, you may take non-aspirin pain medication if needed.  PAIN MEDICATION CAN BE VERY CONSTIPATING.  Take a stool softener or laxative such as senokot, pericolace, or milk of magnesia if needed.      If your physician has prescribed pain medication that includes Acetaminophen (Tylenol), do not take additional Acetaminophen (Tylenol) while taking the prescribed medication.    Depression / Suicide Risk    As you are discharged from this [facility] facility, it is important to learn how to keep safe from harming yourself.    Recognize the warning signs:  · Abrupt changes in personality, positive or negative- including increase in energy   · Giving away possessions  · Change in eating patterns- significant weight changes-  positive or negative  · Change in sleeping patterns- unable to sleep or sleeping all the time   · Unwillingness or inability to communicate  · Depression  · Unusual sadness, discouragement and loneliness  · Talk of wanting to die  · Neglect of personal appearance   · Rebelliousness- reckless behavior  · Withdrawal from people/activities they love  · Confusion- inability to concentrate     If you or a loved one observes any of these behaviors or has concerns about self-harm, here's what you can do:  · Talk about it- your feelings and reasons for harming yourself  · Remove any  means that you might use to hurt yourself (examples: pills, rope, extension cords, firearm)  · Get professional help from the community (Mental Health, Substance Abuse, psychological counseling)  · Do not be alone:Call your Safe Contact- someone whom you trust who will be there for you.  · Call your local CRISIS HOTLINE 797-2639 or 781-601-2196  · Call your local Children's Mobile Crisis Response Team [place] (175) 654-8127 or wwwMarketArt  · Call the toll free National Suicide Prevention Hotlines   · National Suicide Prevention Lifeline 757-635-VEEN (7264)  · National Hope Line Network 800-SUICIDE (983-8704)      Liver Biopsy, Care After  Refer to this sheet in the next few weeks. These instructions provide you with information on caring for yourself after your procedure. Your health care provider may also give you more specific instructions. Your treatment has been planned according to current medical practices, but problems sometimes occur. Call your health care provider if you have any problems or questions after your procedure.  WHAT TO EXPECT AFTER THE PROCEDURE  After your procedure, it is typical to have the following:  · A small amount of discomfort in the area where the biopsy was done and in the right shoulder or shoulder blade.  · A small amount of bruising around the area where the biopsy was done and on the skin over the liver.  · Sleepiness and fatigue for the rest of the day.  HOME CARE INSTRUCTIONS   · Rest at home for 1-2 days or as directed by your health care provider.  · Have a friend or family member stay with you for at least 24 hours.  · Because of the medicines used during the procedure, you should not do the following things in the first 24 hours:  ¨ Drive.  ¨ Use machinery.  ¨ Be responsible for the care of other people.  ¨ Sign legal documents.  ¨ Take a bath or shower.  · There are many different ways to close and cover an incision, including stitches, skin glue, and  adhesive strips. Follow your health care provider's instructions on:  ¨ Incision care.  ¨ Bandage (dressing) changes and removal.  ¨ Incision closure removal.  · Do not drink alcohol in the first week.  · Do not lift more than 5 pounds or play contact sports for 2 weeks after this test.  · Take medicines only as directed by your health care provider. Do not take medicine containing aspirin or non-steroidal anti-inflammatory medicines such as ibuprofen for 1 week after this test.  · It is your responsibility to get your test results.  SEEK MEDICAL CARE IF:   · You have increased bleeding from an incision that results in more than a small spot of blood.  · You have redness, swelling, or increasing pain in any incisions.  · You notice a discharge or a bad smell coming from any of your incisions.  · You have a fever or chills.  SEEK IMMEDIATE MEDICAL CARE IF:   · You develop swelling, bloating, or pain in your abdomen.  · You become dizzy or faint.  · You develop a rash.  · You are nauseous or vomit.  · You have difficulty breathing, feel short of breath, or feel faint.  · You develop chest pain.  · You have problems with your speech or vision.  · You have trouble balancing or moving your arms or legs.     This information is not intended to replace advice given to you by your health care provider. Make sure you discuss any questions you have with your health care provider.     Document Released: 07/07/2006 Document Revised: 01/08/2016 Document Reviewed: 02/13/2015  [employer] Interactive Patient Education ©2016 [employer]      Liver Biopsy  The liver is a large organ in the upper right-hand side of your abdomen. A liver biopsy is a procedure in which a tissue sample is taken from the liver and examined under a microscope. The procedure is done to confirm a suspected problem.  There are three types of liver biopsies:  · Percutaneous. In this type, an incision is made in your abdomen. The sample is removed through the  incision with a needle.  · Laparoscopic. In this type, several incisions are made in the abdomen. A tiny camera is passed through one of the incisions to help guide the health care provider. The sample is removed through the other incision or incisions.  · Transjugular. In this type, an incision is made in the neck. A tube is passed through the incision to the liver. The sample is removed through the tube with a needle.  Tell a health care provider about:  · Any allergies you have.  · All medicines you are taking, including vitamins, herbs, eye drops, creams, and over-the-counter medicines.  · Any problems you or family members have had with anesthetic medicines.  · Any blood disorders you have.  · Any surgeries you have had.  · Any medical conditions you have.  · Possibility of pregnancy, if this applies.  What are the risks?  Generally, this is a safe procedure. However, problems can occur and include:  · Bleeding.  · Infection.  · Bruising.  · Collapsed lung.  · Leak of digestive juices (bile) from the liver or gallbladder.  · Problems with heart rhythm.  · Pain at the biopsy site or in the right shoulder.  · Low blood pressure (hypotension).  · Injury to nearby organs or tissues.  What happens before the procedure?  · Your health care provider may do some blood or urine tests. These will help your health care provider learn how well your kidneys and liver are working and how well your blood clots.  · Ask your health care provider if you will be able to go home the day of the procedure. Arrange for someone to take you home and stay with you for at least 24 hours.  · Do not eat or drink anything after midnight on the night before the procedure or as directed by your health care provider.  · Ask your health care provider about:  ¨ Changing or stopping your regular medicines. This is especially important if you are taking diabetes medicines or blood thinners.  ¨ Taking medicines such as aspirin and ibuprofen. These  medicines can thin your blood. Do not take these medicines before your procedure if your health care provider asks you not to.  What happens during the procedure?  Regardless of the type of biopsy that will be done, you will have an IV line placed. Through this line, you will receive fluids and medicine to relax you. If you will be having a laparoscopic biopsy, you may also receive medicine through this line to make you sleep during the procedure (general anesthetic).  Percutaneous Liver Biopsy  · You will positioned on your back, with your right hand over your head.  · A health care provider will locate your liver by tapping and pressing on the right side of your abdomen or with the help of an ultrasound machine or CT scan.  · An area at the bottom of your last right rib will be numbed.  · An incision will be made in the numbed area.  · The biopsy needle will be inserted into the incision.  · Several samples of liver tissue will be taken with the biopsy needle. You will be asked to hold your breath as each sample is taken.  Laparoscopic Liver Biopsy  · You will be positioned on your back.  · Several small incisions will be made in your abdomen.  · Your doctor will pass a tiny camera through one incision. The camera will allow the liver to be viewed on a TV monitor in the operating room.  · Tools will be passed through the other incision or incisions. These tools will be used to remove samples of liver tissue.  Transjugular Liver Biopsy  · You will be positioned on your back on an X-ray table, with your head turned to your left.  · An area on your neck just over your jugular vein will be numbed.  · An incision will be made in the numbed area.  · A tiny tube will be inserted through the incision. It will be pushed through the jugular vein to a blood vessel in the liver called the hepatic vein.  · Dye will be inserted through the tube, and X-rays will be taken. The dye will make the blood vessels in the liver light up  on the X-rays.  · The biopsy needle will be pushed through the tube until it reaches the liver.  · Samples of liver tissue will be taken with the biopsy needle.  · The needle and the tube will be removed.  After the samples are obtained, the incision or incisions will be closed.  What happens after the procedure?  · You will be taken to a recovery area.  · You may have to lie on your right side for 1-2 hours. This will prevent bleeding from the biopsy site.  · Your progress will be watched. Your blood pressure, pulse, and the biopsy site will be checked often.  · You may have some pain or feel sick. If this happens, tell your health care provider.  · As you begin to feel better, you will be offered ice and beverages.  · You may be allowed to go home when the medicines have worn off and you can walk, drink, eat, and use the bathroom.  This information is not intended to replace advice given to you by your health care provider. Make sure you discuss any questions you have with your health care provider.  Document Released: 03/09/2005 Document Revised: 05/22/2017 Document Reviewed: 02/13/2015  [employer] Interactive Patient Education © 2017 [employer]

## 2019-01-15 NOTE — DISCHARGE INSTRUCTIONS
ACTIVITY: Rest and take it easy for the first 24 hours.  A responsible adult is recommended to remain with you during that time.  It is normal to feel sleepy.  We encourage you to not do anything that requires balance, judgment or coordination.    MILD FLU-LIKE SYMPTOMS ARE NORMAL. YOU MAY EXPERIENCE GENERALIZED MUSCLE ACHES, THROAT IRRITATION, HEADACHE AND/OR SOME NAUSEA.    FOR 24 HOURS DO NOT:  Drive, operate machinery or run household appliances.  Drink beer or alcoholic beverages.   Make important decisions or sign legal documents.    SPECIAL INSTRUCTIONS:  Keep surgical site clean dry and covered until fully healed. Do not lift over 10 lbs for 2 week      DIET: To avoid nausea, slowly advance diet as tolerated, avoiding spicy or greasy foods for the first day.  Add more substantial food to your diet according to your physician's instructions.  Babies can be fed formula or breast milk as soon as they are hungry.  INCREASE FLUIDS AND FIBER TO AVOID CONSTIPATION.    SURGICAL DRESSING/BATHING: Keep surgical site clean dry and covered until fully healed.    FOLLOW-UP APPOINTMENT:  A follow-up appointment should be arranged with your doctor in 1 week; call to schedule.    You should CALL YOUR PHYSICIAN if you develop:  Fever greater than 101 degrees F.  Pain not relieved by medication, or persistent nausea or vomiting.  Excessive bleeding (blood soaking through dressing) or unexpected drainage from the wound.  Extreme redness or swelling around the incision site, drainage of pus or foul smelling drainage.  Inability to urinate or empty your bladder within 8 hours.  Problems with breathing or chest pain.    You should call 911 if you develop problems with breathing or chest pain.  If you are unable to contact your doctor or surgical center, you should go to the nearest emergency room or urgent care center.     If any questions arise, call your doctor.  If your doctor is not available, please feel free to call the  Surgical Center at [phone].  The Center is open Monday through Friday from 7AM to 7PM.  You can also call the HEALTH HOTLINE open 24 hours/day, 7 days/week and speak to a nurse at [phone], or toll free at [phone].    A registered nurse may call you a few days after your surgery to see how you are doing after your procedure.    MEDICATIONS: Resume taking daily medication.  Take prescribed pain medication with food.  If no medication is prescribed, you may take non-aspirin pain medication if needed.  PAIN MEDICATION CAN BE VERY CONSTIPATING.  Take a stool softener or laxative such as senokot, pericolace, or milk of magnesia if needed.      If your physician has prescribed pain medication that includes Acetaminophen (Tylenol), do not take additional Acetaminophen (Tylenol) while taking the prescribed medication.    Depression / Suicide Risk    As you are discharged from this [facility] facility, it is important to learn how to keep safe from harming yourself.    Recognize the warning signs:  · Abrupt changes in personality, positive or negative- including increase in energy   · Giving away possessions  · Change in eating patterns- significant weight changes-  positive or negative  · Change in sleeping patterns- unable to sleep or sleeping all the time   · Unwillingness or inability to communicate  · Depression  · Unusual sadness, discouragement and loneliness  · Talk of wanting to die  · Neglect of personal appearance   · Rebelliousness- reckless behavior  · Withdrawal from people/activities they love  · Confusion- inability to concentrate     If you or a loved one observes any of these behaviors or has concerns about self-harm, here's what you can do:  · Talk about it- your feelings and reasons for harming yourself  · Remove any means that you might use to hurt yourself (examples: pills, rope, extension cords, firearm)  · Get professional help from the community (Mental Health, Substance Abuse,  psychological counseling)  · Do not be alone:Call your Safe Contact- someone whom you trust who will be there for you.  · Call your local CRISIS HOTLINE 279-8959 or 526-156-5520  · Call your local Children's Mobile Crisis Response Team [place] (597) 861-2737 or www.[employer]  · Call the toll free National Suicide Prevention Hotlines   · National Suicide Prevention Lifeline 514-390-VEHA (3425)  · [employer] Network 800-SUICIDE (410-0452)      Liver Biopsy, Care After  Refer to this sheet in the next few weeks. These instructions provide you with information on caring for yourself after your procedure. Your health care provider may also give you more specific instructions. Your treatment has been planned according to current medical practices, but problems sometimes occur. Call your health care provider if you have any problems or questions after your procedure.  WHAT TO EXPECT AFTER THE PROCEDURE  After your procedure, it is typical to have the following:  · A small amount of discomfort in the area where the biopsy was done and in the right shoulder or shoulder blade.  · A small amount of bruising around the area where the biopsy was done and on the skin over the liver.  · Sleepiness and fatigue for the rest of the day.  HOME CARE INSTRUCTIONS   · Rest at home for 1-2 days or as directed by your health care provider.  · Have a friend or family member stay with you for at least 24 hours.  · Because of the medicines used during the procedure, you should not do the following things in the first 24 hours:  ¨ Drive.  ¨ Use machinery.  ¨ Be responsible for the care of other people.  ¨ Sign legal documents.  ¨ Take a bath or shower.  · There are many different ways to close and cover an incision, including stitches, skin glue, and adhesive strips. Follow your health care provider's instructions on:  ¨ Incision care.  ¨ Bandage (dressing) changes and removal.  ¨ Incision closure removal.  · Do not drink  alcohol in the first week.  · Do not lift more than 5 pounds or play contact sports for 2 weeks after this test.  · Take medicines only as directed by your health care provider. Do not take medicine containing aspirin or non-steroidal anti-inflammatory medicines such as ibuprofen for 1 week after this test.  · It is your responsibility to get your test results.  SEEK MEDICAL CARE IF:   · You have increased bleeding from an incision that results in more than a small spot of blood.  · You have redness, swelling, or increasing pain in any incisions.  · You notice a discharge or a bad smell coming from any of your incisions.  · You have a fever or chills.  SEEK IMMEDIATE MEDICAL CARE IF:   · You develop swelling, bloating, or pain in your abdomen.  · You become dizzy or faint.  · You develop a rash.  · You are nauseous or vomit.  · You have difficulty breathing, feel short of breath, or feel faint.  · You develop chest pain.  · You have problems with your speech or vision.  · You have trouble balancing or moving your arms or legs.     This information is not intended to replace advice given to you by your health care provider. Make sure you discuss any questions you have with your health care provider.     Document Released: 07/07/2006 Document Revised: 01/08/2016 Document Reviewed: 02/13/2015  [employer] Interactive Patient Education ©2016 [employer]      Liver Biopsy  The liver is a large organ in the upper right-hand side of your abdomen. A liver biopsy is a procedure in which a tissue sample is taken from the liver and examined under a microscope. The procedure is done to confirm a suspected problem.  There are three types of liver biopsies:  · Percutaneous. In this type, an incision is made in your abdomen. The sample is removed through the incision with a needle.  · Laparoscopic. In this type, several incisions are made in the abdomen. A tiny camera is passed through one of the incisions to help guide the health  care provider. The sample is removed through the other incision or incisions.  · Transjugular. In this type, an incision is made in the neck. A tube is passed through the incision to the liver. The sample is removed through the tube with a needle.  Tell a health care provider about:  · Any allergies you have.  · All medicines you are taking, including vitamins, herbs, eye drops, creams, and over-the-counter medicines.  · Any problems you or family members have had with anesthetic medicines.  · Any blood disorders you have.  · Any surgeries you have had.  · Any medical conditions you have.  · Possibility of pregnancy, if this applies.  What are the risks?  Generally, this is a safe procedure. However, problems can occur and include:  · Bleeding.  · Infection.  · Bruising.  · Collapsed lung.  · Leak of digestive juices (bile) from the liver or gallbladder.  · Problems with heart rhythm.  · Pain at the biopsy site or in the right shoulder.  · Low blood pressure (hypotension).  · Injury to nearby organs or tissues.  What happens before the procedure?  · Your health care provider may do some blood or urine tests. These will help your health care provider learn how well your kidneys and liver are working and how well your blood clots.  · Ask your health care provider if you will be able to go home the day of the procedure. Arrange for someone to take you home and stay with you for at least 24 hours.  · Do not eat or drink anything after midnight on the night before the procedure or as directed by your health care provider.  · Ask your health care provider about:  ¨ Changing or stopping your regular medicines. This is especially important if you are taking diabetes medicines or blood thinners.  ¨ Taking medicines such as aspirin and ibuprofen. These medicines can thin your blood. Do not take these medicines before your procedure if your health care provider asks you not to.  What happens during the procedure?  Regardless  of the type of biopsy that will be done, you will have an IV line placed. Through this line, you will receive fluids and medicine to relax you. If you will be having a laparoscopic biopsy, you may also receive medicine through this line to make you sleep during the procedure (general anesthetic).  Percutaneous Liver Biopsy  · You will positioned on your back, with your right hand over your head.  · A health care provider will locate your liver by tapping and pressing on the right side of your abdomen or with the help of an ultrasound machine or CT scan.  · An area at the bottom of your last right rib will be numbed.  · An incision will be made in the numbed area.  · The biopsy needle will be inserted into the incision.  · Several samples of liver tissue will be taken with the biopsy needle. You will be asked to hold your breath as each sample is taken.  Laparoscopic Liver Biopsy  · You will be positioned on your back.  · Several small incisions will be made in your abdomen.  · Your doctor will pass a tiny camera through one incision. The camera will allow the liver to be viewed on a TV monitor in the operating room.  · Tools will be passed through the other incision or incisions. These tools will be used to remove samples of liver tissue.  Transjugular Liver Biopsy  · You will be positioned on your back on an X-ray table, with your head turned to your left.  · An area on your neck just over your jugular vein will be numbed.  · An incision will be made in the numbed area.  · A tiny tube will be inserted through the incision. It will be pushed through the jugular vein to a blood vessel in the liver called the hepatic vein.  · Dye will be inserted through the tube, and X-rays will be taken. The dye will make the blood vessels in the liver light up on the X-rays.  · The biopsy needle will be pushed through the tube until it reaches the liver.  · Samples of liver tissue will be taken with the biopsy needle.  · The needle  and the tube will be removed.  After the samples are obtained, the incision or incisions will be closed.  What happens after the procedure?  · You will be taken to a recovery area.  · You may have to lie on your right side for 1-2 hours. This will prevent bleeding from the biopsy site.  · Your progress will be watched. Your blood pressure, pulse, and the biopsy site will be checked often.  · You may have some pain or feel sick. If this happens, tell your health care provider.  · As you begin to feel better, you will be offered ice and beverages.  · You may be allowed to go home when the medicines have worn off and you can walk, drink, eat, and use the bathroom.  This information is not intended to replace advice given to you by your health care provider. Make sure you discuss any questions you have with your health care provider.  Document Released: 03/09/2005 Document Revised: 05/22/2017 Document Reviewed: 02/13/2015  [employer] Interactive Patient Education © 2017 [employer]

## 2019-01-17 NOTE — TELEPHONE ENCOUNTER
"Patient called to cancel today's appointment with OREN Becker 01/17/2019 at 12:00 pm. Patient stated \"I'm snowed in and I will not be able to make it to my appointment.\" I asked patient if she would like to go ahead and reschedule and patient stated \"Actually not right yet, I would like Lizeth to give me a call to see how she wants to proceed this.\" I explained to patient that scheduling a follow up appointment would be best so that OREN Becker can discuss with patient. Patient stated \"Can you please just send her a message and let her know to give me a call.\" I verbalized understanding and let patient know I will relay message to OREN Becker.  "

## 2019-01-18 NOTE — TELEPHONE ENCOUNTER
Patient was scheduled to see me yesterday to review the liver biopsy results.  Unfortunately due to the weather patient was unable to make the appointment.  I did contact patient via phone yesterday and left her a voice message.  I did contact her again this morning and was able to discuss the results of the biopsy with her over the phone.  Unfortunately patient's biopsy does show metastatic small cell carcinoma biopsied of the liver.    Patient was very concerned that she did not have a biopsy of the large masses that she feels in her abdomen.  I did try to explain to patient that she has multiple liver lesions, largest measuring 5 cm in size.  Those masses that she is feeling in the abdomen are still within the liver however there were multiple liver lesions to biopsy and we were able to get a successful biopsy results.      I will placed an urgent referral to medical oncology for further evaluation and discussion of further plan of care based on most recent imaging.  I also spoke with patient in detail and we will attempt to get all medical records from [facility] in [place] as patient was being treated for her previous small cell lung cancer with Dr. Kennedy.  Patient has approved for us to contact her previous oncologist in order to get records.    I will defer any further staging workup to medical oncology based on these biopsy results and patient's past history.  Dependent upon whether patient will proceed with any further treatment or not will then determine whether further staging workup will be needed.    No further IOC as needed.      FINAL DIAGNOSIS:    A. Liver biopsy targeted, 2 cores in formalin:         Metastatic, small cell carcinoma.    Comment: This patient has a history of small cell lung cancer diagnosed  in 2016 per the medical record. She is status post chemotherapy. This  case was reviewed with Dr. Warren she concurs with the diagnosis.      1. Metastatic non-small cell lung cancer (HCC)  REFERRAL  TO HEMATOLOGY ONCOLOGY Referral to? [facility]

## 2019-01-21 NOTE — PROGRESS NOTES
Date of visit: 1/21/2019  9:15 AM      Chief Complaint- metastatic small cell lung cancer.       Identification/Prior relevant history: Asha Valero  is a 69 y.o. year old female who is here for follow-up of metastatic small lung cell cancer.     Patient was seen in this clinic on 1/8/19 after referral for evaluation of abdominal mass was placed by her PCP. Patient had CT chest and abdomen done that showed multiple lesion on liver with 2 of them measuring 5cm, also showed mediastinal and right hilar lymphadenopathy with postobstructive changes in the right lower lobe. Patient then had a liver biopsy that confirmed that masses are metastatic lesion of small lung cell cancer.     Interim history  Patient reported that she continues to have heart burn and early satiety and sometimes has nausea without vomiting. She has noted significant weight loss due to decreased ability to eat, due to always feeling full. Patient also continues to have SOB and productive cough of clear sputum. Also sometimes she has hemoptysis. Denies any fever, chills, palpitations, or chest pain.     Past Medical History:      Past Medical History:   Diagnosis Date   • Cancer (HCC)        Past surgical history:       Past Surgical History:   Procedure Laterality Date   • THYROIDECTOMY  1988   • BREAST RECONSTRUCTION         Allergies:       Patient has no known allergies.    Medications:         Current Outpatient Prescriptions   Medication Sig Dispense Refill   • levothyroxine (SYNTHROID) 88 MCG Tab Take 1 Tab by mouth Every morning on an empty stomach. 90 Tab 1   • albuterol 108 (90 Base) MCG/ACT Aero Soln inhalation aerosol Inhale 2 Puffs by mouth every 6 hours as needed for Shortness of Breath.       No current facility-administered medications for this visit.          Social History:     Social History     Social History   • Marital status: Single     Spouse name: N/A   • Number of children: 1   • Years of education: N/A     Occupational  "History   • Not on file.     Social History Main Topics   • Smoking status: Former Smoker     Packs/day: 1.00     Years: 45.00     Quit date: 10/2/2011   • Smokeless tobacco: Never Used   • Alcohol use No   • Drug use: Unknown   • Sexual activity: Not on file     Other Topics Concern   • Exercise Yes     Social History Narrative   • No narrative on file       Family History:      Family History   Problem Relation Age of Onset   • Other Mother         brain aneurysm   • Cancer Neg Hx        Review of Systems:  All other review of systems are negative except what was mentioned above in the HPI.    Constitutional: Negative for fever, chills. Positive for weight loss and malaise/fatigue.    HEENT: No new auditory or visual complaints. No sore throat and neck pain.     Respiratory: Positive for cough, sputum production, shortness of breath and hemoptysis.    Cardiovascular: Negative for chest pain, palpitations, orthopnea and leg swelling.    Gastrointestinal: Positive for heartburn, nausea. Negative for vomiting and abdominal pain.    Genitourinary: Negative for dysuria, hematuria    Musculoskeletal: Positive for back pain   Skin: Negative for rash and itching.    Neurological: Negative for focal weakness and headaches.    Endo/Heme/Allergies: No abnormal bleed/bruise.    Psychiatric/Behavioral: No new depression/anxiety.    Physical Exam:  Vitals: /75 (BP Location: Right arm, Patient Position: Sitting)   Pulse (!) 102   Temp 37.1 °C (98.8 °F) (Temporal)   Resp 14   Ht 1.626 m (5' 4\")   Wt 61.6 kg (135 lb 12.9 oz)   LMP  (LMP Unknown)   SpO2 90%   BMI 23.31 kg/m²     General: Not in acute distress, alert and oriented x 3  HEENT: No pallor, icterus. Oropharynx clear.   Neck: Supple, no palpable masses.  Lymph nodes: No palpable cervical, supraclavicular, axillary or inguinal lymphadenopathy.    CVS: regular rate and rhythm, no rubs or gallops  RESP: Clear to auscultate bilaterally, except decreased sounds " on RLL,  no wheezing or crackles.   ABD: Soft, non tender, mild distention, positive bowel sounds, enlarged liver   EXT: No edema or cyanosis  Back: no tenderness upon palpation   CNS: Alert and oriented x3, No focal deficits.  Skin- No rash      Labs:   Admission on 01/15/2019, Discharged on 01/15/2019   Component Date Value Ref Range Status   • Pathology Request 01/15/2019 Sent to Histo   Final         Assessment and Plan:      Small cell lung cancer (HCC)- 2016 in [place] at [place]; chemo and RT   Liver metastasis   A 70y/o female that had history of small cell lung cancer in 2016 and is s/p chemotherapy and radiation. Patient presented at this clinic with suspicion of abdominal mass. CT chest and abdomen showed multiple lesion on liver with 2 of them measuring 5cm, also showed mediastinal and right hilar lymphadenopathy with postobstructive changes in the right lower lobe. After these results patient had a liver biopsy that confirmed that the masses are metastasis from small cell lung cancer.   Extensive discussion of findings and prognosis was done with patient and her son. Due to her prior history and extensive metastatic disease in liver, chemotherapy vs immunotherapy are not curative. Discuss the possible side effects of both therapies and the complications. Also discuss the disease progression if no treatment is done and discuss the possibility of Hospice.   Patient at this time has quality of life, her current symptoms include early satiety, heart burn, and shortness of breath. Patient was oriented that she could eventually develop more symptoms.   Patient is interested in Hospice, but will like to further discuss with family before making a decision.   A referral for Hospice was placed.   Labs were ordered in case patient decides to proceed with treatment. If that is the case depending on lab results will determine between chemotherapy vs immunotherapy. Also if patient decides to start treatment will  need imaging of brain to exclude extension of disease there.     Relevant Orders    CBC WITH DIFFERENTIAL    COMP METABOLIC PANEL    LDH    REFERRAL TO OTHER        She agreed and verbalized  agreement and understanding with the current plan.  I answered all questions and concerns at this time     Please note that this dictation was created using voice recognition software. I have made every reasonable attempt to correct obvious errors, but I expect that there are errors of grammar and possibly content that I did not discover before finalizing the note.      SIGNATURES:  Viola Lou    CC:  Nell Walker M.D.  Lizeth Hoffman A.P.N.

## 2019-01-21 NOTE — TELEPHONE ENCOUNTER
ESTABLISHED PATIENT PRE-VISIT PLANNING     Patient was NOT contacted to complete PVP.     Note: Patient will not be contacted if there is no indication to call.     1.  Reviewed notes from the last few office visits within the medical group: Yes    2.  If any orders were placed at last visit or intended to be done for this visit (i.e. 6 mos follow-up), do we have Results/Consult Notes?        •  Labs - Labs were not ordered at last office visit.   Note: If patient appointment is for lab review and patient did not complete labs, check with provider if OK to reschedule patient until labs completed.       •  Imaging - Imaging was not ordered at last office visit.       •  Referrals - No referrals were ordered at last office visit.    3. Is this appointment scheduled as a Hospital Follow-Up? No    4.  Immunizations were updated in Epic using WebIZ?: Epic matches WebIZ       •  Web Iz Recommendations: PREVNAR (PCV13) , TDAP and SHINGRIX (Shingles)    5.  Patient is due for the following Health Maintenance Topics:   Health Maintenance Due   Topic Date Due   • Annual Wellness Visit  1950   • IMM DTaP/Tdap/Td Vaccine (1 - Tdap) 01/05/1969   • MAMMOGRAM  01/05/1990   • COLONOSCOPY  01/05/2000   • IMM ZOSTER VACCINES (1 of 2) 01/05/2000   • BONE DENSITY  01/05/2015   • IMM PNEUMOCOCCAL 65+ (ADULT) LOW/MEDIUM RISK SERIES (1 of 2 - PCV13) 01/05/2015           6. Orders for overdue Health Maintenance topics pended in Pre-Charting? YES    7.  AHA (MDX) form printed for Provider? YES    8.  Patient was NOT informed to arrive 15 min prior to their scheduled appointment and bring in their medication bottles.

## 2019-01-22 NOTE — TELEPHONE ENCOUNTER
"Patient was seen yesterday in the office with Dr. Resendiz and patient is requesting a call back regarding either doing hospice. Patient stated \"I would like to have Dr. Resendiz to give me a call back. I'm wanting to discuss with him hospice or something else and also pain medication.\" I let patient know I will relay this message to Dr. Resendiz and his medical assistant, Brenna PEACOCK. Patient agreed.  "

## 2019-01-22 NOTE — TELEPHONE ENCOUNTER
"Patient returned phone call and is a bit nervous, she would like a call back as soon as possible. Patient stated \"I'm just nervous I don't know what hospice, I would like for doctor oliver to suggest one. Also I would like to know if there is a over the counter pain medication which I can take mean time which will not effect my liver.\" I explained to patient our providers have a patients today and will reach out by the end of the business day, and if she had any further questions to not hesitate to give me a call back and I will relay this message to OREN Becker. Patient is just very nervous and would like to know what hospice and more information regarding hospice.   "

## 2019-01-22 NOTE — PROGRESS NOTES
Nurse Navigation received a referral to follow patient.  Unfortunately the patient had poor results from her pet scan with bulky disease and new liver mets.  The medical oncology treatment team has recommended hospice.  Nurse navigation will sign off for now please place a new referral if needed.  Thank you.

## 2019-01-23 NOTE — TELEPHONE ENCOUNTER
Returned patient's phone call.  Patient has decided that she would like to proceed with hospice and is requesting to be referred to [facility] hospice.  Referral has been placed at patient's request.  Review of Dr. Resendiz's note and he did recommend hospice.  Also discussed with Dr. Resendiz prior to discussing with patient today.    She also wants to know which over-the-counter medication she can take for her current condition.  She states that every once while she may have a headache or backache and did not know if Advil or Tylenol would be appropriate.  I did explain to patient due to her significant disease within the liver would recommend that she try Advil.  She stated the Advil did give her some relief.  I did discuss with her the benefit of hospice and they would be able to help with her with regards to pain management as well.  I also explained that they would be contacting her once the referral was placed to set her appointment to come in and see her.    Patient also stating that she would like to cancel her primary care appointment that she is due to establish with a PCP tomorrow.  I agree that she will be enrolling in hospice.

## 2019-01-24 NOTE — TELEPHONE ENCOUNTER
"Patient called and stated \"Lizeth asked me to call your office to speak with her if the hospice place have not called me yet. I have not yet received a phone call.\" I let patient know I will relay message to OERN Becker and she will give patient a call back as soon as we can.  "

## 2019-01-26 NOTE — TELEPHONE ENCOUNTER
New Oncology Patient 48 hour follow up:    Called to welcome patient to [facility] and to follow up on initial consult with Dr. Resendiz on 01/21/2019. Reviewed next steps in the patient’s plan of care. Dr. Resendiz placed a referral to hospice. Patient stated she had appointment with hospice today 01/25/2019 and has no further questions or concerns, however she would like to request a phone call regarding coughing out mucus blood. I asked patient the color of the blood and she stated dark red. I let patient know I will relay message to Teena SALDANA RN. Patient confirmed, answered all patients questions and thanked them for their time. Provided our phone number, [phone], for patient should they have any further questions or concerns.

## 2019-01-26 NOTE — TELEPHONE ENCOUNTER
RN called patient and confirmed that she has establised with hospice. RN directed patient to call her hospice team in regards to care and medical questions going forward

## 2019-03-13 ENCOUNTER — APPOINTMENT (OUTPATIENT)
Dept: HOSPICE | Facility: HOSPICE | Age: 69
End: 2019-03-13
Payer: MEDICARE
